# Patient Record
Sex: MALE | Race: WHITE | NOT HISPANIC OR LATINO | Employment: FULL TIME | URBAN - METROPOLITAN AREA
[De-identification: names, ages, dates, MRNs, and addresses within clinical notes are randomized per-mention and may not be internally consistent; named-entity substitution may affect disease eponyms.]

---

## 2017-09-03 ENCOUNTER — HOSPITAL ENCOUNTER (OUTPATIENT)
Facility: OTHER | Age: 27
Discharge: HOME | End: 2017-09-04
Attending: EMERGENCY MEDICINE | Admitting: ORTHOPAEDIC SURGERY
Payer: COMMERCIAL

## 2017-09-03 DIAGNOSIS — S66.922A LACERATION OF LEFT HAND INVOLVING TENDON, INITIAL ENCOUNTER: Primary | ICD-10-CM

## 2017-09-03 DIAGNOSIS — S61.412A LACERATION OF LEFT HAND INVOLVING TENDON, INITIAL ENCOUNTER: Primary | ICD-10-CM

## 2017-09-03 DIAGNOSIS — T14.90XA INJURY: ICD-10-CM

## 2017-09-03 DIAGNOSIS — S64.40XA INJURY OF DIGITAL NERVE OF FINGER, INITIAL ENCOUNTER: ICD-10-CM

## 2017-09-03 PROCEDURE — 90471 IMMUNIZATION ADMIN: CPT | Performed by: EMERGENCY MEDICINE

## 2017-09-03 PROCEDURE — 99285 EMERGENCY DEPT VISIT HI MDM: CPT | Mod: 25

## 2017-09-03 PROCEDURE — 25000003 PHARM REV CODE 250: Performed by: EMERGENCY MEDICINE

## 2017-09-03 PROCEDURE — G0378 HOSPITAL OBSERVATION PER HR: HCPCS

## 2017-09-03 PROCEDURE — 63600175 PHARM REV CODE 636 W HCPCS: Performed by: EMERGENCY MEDICINE

## 2017-09-03 PROCEDURE — 90670 PCV13 VACCINE IM: CPT | Performed by: EMERGENCY MEDICINE

## 2017-09-03 PROCEDURE — 63600175 PHARM REV CODE 636 W HCPCS: Performed by: ORTHOPAEDIC SURGERY

## 2017-09-03 PROCEDURE — 96374 THER/PROPH/DIAG INJ IV PUSH: CPT

## 2017-09-03 RX ORDER — MORPHINE SULFATE 4 MG/ML
4 INJECTION, SOLUTION INTRAMUSCULAR; INTRAVENOUS
Status: DISCONTINUED | OUTPATIENT
Start: 2017-09-03 | End: 2017-09-04 | Stop reason: HOSPADM

## 2017-09-03 RX ORDER — SODIUM CHLORIDE 9 MG/ML
INJECTION, SOLUTION INTRAVENOUS CONTINUOUS
Status: DISCONTINUED | OUTPATIENT
Start: 2017-09-03 | End: 2017-09-04 | Stop reason: HOSPADM

## 2017-09-03 RX ORDER — HYDROCODONE BITARTRATE AND ACETAMINOPHEN 5; 325 MG/1; MG/1
1 TABLET ORAL EVERY 4 HOURS PRN
Status: DISCONTINUED | OUTPATIENT
Start: 2017-09-03 | End: 2017-09-04 | Stop reason: HOSPADM

## 2017-09-03 RX ORDER — HYDROMORPHONE HYDROCHLORIDE 1 MG/ML
1 INJECTION, SOLUTION INTRAMUSCULAR; INTRAVENOUS; SUBCUTANEOUS EVERY 4 HOURS PRN
Status: DISCONTINUED | OUTPATIENT
Start: 2017-09-03 | End: 2017-09-03

## 2017-09-03 RX ORDER — METHOCARBAMOL 750 MG/1
750 TABLET, FILM COATED ORAL NIGHTLY PRN
COMMUNITY

## 2017-09-03 RX ORDER — PANTOPRAZOLE SODIUM 40 MG/1
40 TABLET, DELAYED RELEASE ORAL DAILY
COMMUNITY

## 2017-09-03 RX ORDER — FAMOTIDINE 20 MG/1
20 TABLET, FILM COATED ORAL DAILY
COMMUNITY

## 2017-09-03 RX ORDER — HYDROMORPHONE HYDROCHLORIDE 1 MG/ML
1 INJECTION, SOLUTION INTRAMUSCULAR; INTRAVENOUS; SUBCUTANEOUS
Status: COMPLETED | OUTPATIENT
Start: 2017-09-03 | End: 2017-09-03

## 2017-09-03 RX ORDER — DIPHENHYDRAMINE HYDROCHLORIDE 50 MG/ML
25 INJECTION INTRAMUSCULAR; INTRAVENOUS NIGHTLY PRN
Status: DISCONTINUED | OUTPATIENT
Start: 2017-09-03 | End: 2017-09-04 | Stop reason: HOSPADM

## 2017-09-03 RX ORDER — GEMFIBROZIL 600 MG/1
600 TABLET, FILM COATED ORAL DAILY
COMMUNITY

## 2017-09-03 RX ORDER — CEFAZOLIN SODIUM 1 G/50ML
1 SOLUTION INTRAVENOUS
Status: DISCONTINUED | OUTPATIENT
Start: 2017-09-03 | End: 2017-09-04 | Stop reason: HOSPADM

## 2017-09-03 RX ADMIN — HYDROMORPHONE HYDROCHLORIDE 1 MG: 1 INJECTION, SOLUTION INTRAMUSCULAR; INTRAVENOUS; SUBCUTANEOUS at 08:09

## 2017-09-03 RX ADMIN — DIPHENHYDRAMINE HYDROCHLORIDE 25 MG: 50 INJECTION, SOLUTION INTRAMUSCULAR; INTRAVENOUS at 11:09

## 2017-09-03 RX ADMIN — PNEUMOCOCCAL 13-VALENT CONJUGATE VACCINE 0.5 ML: 2.2; 2.2; 2.2; 2.2; 2.2; 4.4; 2.2; 2.2; 2.2; 2.2; 2.2; 2.2; 2.2 INJECTION, SUSPENSION INTRAMUSCULAR at 11:09

## 2017-09-03 RX ADMIN — MORPHINE SULFATE 4 MG: 4 INJECTION, SOLUTION INTRAMUSCULAR; INTRAVENOUS at 11:09

## 2017-09-03 RX ADMIN — CEFAZOLIN SODIUM 1 G: 1 SOLUTION INTRAVENOUS at 11:09

## 2017-09-03 RX ADMIN — SODIUM CHLORIDE: 0.9 INJECTION, SOLUTION INTRAVENOUS at 11:09

## 2017-09-04 ENCOUNTER — ANESTHESIA EVENT (OUTPATIENT)
Dept: SURGERY | Facility: OTHER | Age: 27
End: 2017-09-04
Payer: COMMERCIAL

## 2017-09-04 ENCOUNTER — ANESTHESIA (OUTPATIENT)
Dept: SURGERY | Facility: OTHER | Age: 27
End: 2017-09-04
Payer: COMMERCIAL

## 2017-09-04 VITALS
BODY MASS INDEX: 38.38 KG/M2 | HEIGHT: 71 IN | WEIGHT: 274.13 LBS | RESPIRATION RATE: 18 BRPM | TEMPERATURE: 98 F | SYSTOLIC BLOOD PRESSURE: 155 MMHG | OXYGEN SATURATION: 93 % | HEART RATE: 88 BPM | DIASTOLIC BLOOD PRESSURE: 79 MMHG

## 2017-09-04 PROCEDURE — 37000009 HC ANESTHESIA EA ADD 15 MINS: Performed by: ORTHOPAEDIC SURGERY

## 2017-09-04 PROCEDURE — 27800903 OPTIME MED/SURG SUP & DEVICES OTHER IMPLANTS: Performed by: ORTHOPAEDIC SURGERY

## 2017-09-04 PROCEDURE — 71000033 HC RECOVERY, INTIAL HOUR: Performed by: ORTHOPAEDIC SURGERY

## 2017-09-04 PROCEDURE — 25000003 PHARM REV CODE 250: Performed by: ORTHOPAEDIC SURGERY

## 2017-09-04 PROCEDURE — 63600175 PHARM REV CODE 636 W HCPCS: Performed by: EMERGENCY MEDICINE

## 2017-09-04 PROCEDURE — 37000008 HC ANESTHESIA 1ST 15 MINUTES: Performed by: ORTHOPAEDIC SURGERY

## 2017-09-04 PROCEDURE — 36000707: Performed by: ORTHOPAEDIC SURGERY

## 2017-09-04 PROCEDURE — 36000706: Performed by: ORTHOPAEDIC SURGERY

## 2017-09-04 PROCEDURE — 25000003 PHARM REV CODE 250: Performed by: EMERGENCY MEDICINE

## 2017-09-04 PROCEDURE — 25000003 PHARM REV CODE 250: Performed by: NURSE ANESTHETIST, CERTIFIED REGISTERED

## 2017-09-04 PROCEDURE — G0378 HOSPITAL OBSERVATION PER HR: HCPCS

## 2017-09-04 PROCEDURE — 63600175 PHARM REV CODE 636 W HCPCS: Performed by: ORTHOPAEDIC SURGERY

## 2017-09-04 PROCEDURE — 25000003 PHARM REV CODE 250: Performed by: ANESTHESIOLOGY

## 2017-09-04 PROCEDURE — 63600175 PHARM REV CODE 636 W HCPCS: Performed by: NURSE ANESTHETIST, CERTIFIED REGISTERED

## 2017-09-04 PROCEDURE — 27201423 OPTIME MED/SURG SUP & DEVICES STERILE SUPPLY: Performed by: ORTHOPAEDIC SURGERY

## 2017-09-04 DEVICE — IMPLANTABLE DEVICE: Type: IMPLANTABLE DEVICE | Site: HAND | Status: FUNCTIONAL

## 2017-09-04 DEVICE — CONNECTOR NERVE 2X15MM: Type: IMPLANTABLE DEVICE | Site: HAND | Status: FUNCTIONAL

## 2017-09-04 DEVICE — PROTECTOR AXOGUARD NERVE 7X40: Type: IMPLANTABLE DEVICE | Site: HAND | Status: FUNCTIONAL

## 2017-09-04 DEVICE — CONNECTOR NRV AXOGUARD 3X15MM: Type: IMPLANTABLE DEVICE | Site: HAND | Status: FUNCTIONAL

## 2017-09-04 RX ORDER — FENTANYL CITRATE 50 UG/ML
25 INJECTION, SOLUTION INTRAMUSCULAR; INTRAVENOUS EVERY 5 MIN PRN
Status: DISCONTINUED | OUTPATIENT
Start: 2017-09-04 | End: 2017-09-04 | Stop reason: HOSPADM

## 2017-09-04 RX ORDER — SODIUM CHLORIDE, SODIUM LACTATE, POTASSIUM CHLORIDE, CALCIUM CHLORIDE 600; 310; 30; 20 MG/100ML; MG/100ML; MG/100ML; MG/100ML
INJECTION, SOLUTION INTRAVENOUS CONTINUOUS PRN
Status: DISCONTINUED | OUTPATIENT
Start: 2017-09-04 | End: 2017-09-04

## 2017-09-04 RX ORDER — NEOSTIGMINE METHYLSULFATE 1 MG/ML
INJECTION, SOLUTION INTRAVENOUS
Status: DISCONTINUED | OUTPATIENT
Start: 2017-09-04 | End: 2017-09-04

## 2017-09-04 RX ORDER — OXYCODONE HYDROCHLORIDE 5 MG/1
5 TABLET ORAL
Status: DISCONTINUED | OUTPATIENT
Start: 2017-09-04 | End: 2017-09-04 | Stop reason: HOSPADM

## 2017-09-04 RX ORDER — DEXAMETHASONE SODIUM PHOSPHATE 4 MG/ML
INJECTION, SOLUTION INTRA-ARTICULAR; INTRALESIONAL; INTRAMUSCULAR; INTRAVENOUS; SOFT TISSUE
Status: DISCONTINUED | OUTPATIENT
Start: 2017-09-04 | End: 2017-09-04

## 2017-09-04 RX ORDER — OXYCODONE HYDROCHLORIDE 5 MG/1
5 TABLET ORAL EVERY 4 HOURS PRN
Qty: 30 TABLET | Refills: 0 | Status: SHIPPED | OUTPATIENT
Start: 2017-09-04

## 2017-09-04 RX ORDER — MEPERIDINE HYDROCHLORIDE 50 MG/ML
12.5 INJECTION INTRAMUSCULAR; INTRAVENOUS; SUBCUTANEOUS ONCE AS NEEDED
Status: DISCONTINUED | OUTPATIENT
Start: 2017-09-04 | End: 2017-09-04 | Stop reason: HOSPADM

## 2017-09-04 RX ORDER — HYDROMORPHONE HYDROCHLORIDE 2 MG/ML
0.4 INJECTION, SOLUTION INTRAMUSCULAR; INTRAVENOUS; SUBCUTANEOUS EVERY 5 MIN PRN
Status: DISCONTINUED | OUTPATIENT
Start: 2017-09-04 | End: 2017-09-04 | Stop reason: HOSPADM

## 2017-09-04 RX ORDER — ONDANSETRON 2 MG/ML
INJECTION INTRAMUSCULAR; INTRAVENOUS
Status: DISCONTINUED | OUTPATIENT
Start: 2017-09-04 | End: 2017-09-04

## 2017-09-04 RX ORDER — SODIUM CHLORIDE 0.9 % (FLUSH) 0.9 %
3 SYRINGE (ML) INJECTION
Status: DISCONTINUED | OUTPATIENT
Start: 2017-09-04 | End: 2017-09-04 | Stop reason: HOSPADM

## 2017-09-04 RX ORDER — BUPIVACAINE HYDROCHLORIDE 2.5 MG/ML
INJECTION, SOLUTION EPIDURAL; INFILTRATION; INTRACAUDAL
Status: DISCONTINUED | OUTPATIENT
Start: 2017-09-04 | End: 2017-09-04 | Stop reason: HOSPADM

## 2017-09-04 RX ORDER — FENTANYL CITRATE 50 UG/ML
INJECTION, SOLUTION INTRAMUSCULAR; INTRAVENOUS
Status: DISCONTINUED | OUTPATIENT
Start: 2017-09-04 | End: 2017-09-04

## 2017-09-04 RX ORDER — BACITRACIN 50000 [IU]/1
INJECTION, POWDER, FOR SOLUTION INTRAMUSCULAR
Status: DISCONTINUED | OUTPATIENT
Start: 2017-09-04 | End: 2017-09-04 | Stop reason: HOSPADM

## 2017-09-04 RX ORDER — CEPHALEXIN 500 MG/1
500 CAPSULE ORAL 4 TIMES DAILY
Qty: 40 CAPSULE | Refills: 0 | Status: SHIPPED | OUTPATIENT
Start: 2017-09-04 | End: 2017-09-14

## 2017-09-04 RX ORDER — GLYCOPYRROLATE 0.2 MG/ML
INJECTION INTRAMUSCULAR; INTRAVENOUS
Status: DISCONTINUED | OUTPATIENT
Start: 2017-09-04 | End: 2017-09-04

## 2017-09-04 RX ORDER — PROPOFOL 10 MG/ML
VIAL (ML) INTRAVENOUS
Status: DISCONTINUED | OUTPATIENT
Start: 2017-09-04 | End: 2017-09-04

## 2017-09-04 RX ORDER — HYDROMORPHONE HYDROCHLORIDE 2 MG/ML
INJECTION, SOLUTION INTRAMUSCULAR; INTRAVENOUS; SUBCUTANEOUS
Status: DISCONTINUED | OUTPATIENT
Start: 2017-09-04 | End: 2017-09-04

## 2017-09-04 RX ORDER — MIDAZOLAM HYDROCHLORIDE 1 MG/ML
INJECTION INTRAMUSCULAR; INTRAVENOUS
Status: DISCONTINUED | OUTPATIENT
Start: 2017-09-04 | End: 2017-09-04

## 2017-09-04 RX ORDER — ROCURONIUM BROMIDE 10 MG/ML
INJECTION, SOLUTION INTRAVENOUS
Status: DISCONTINUED | OUTPATIENT
Start: 2017-09-04 | End: 2017-09-04

## 2017-09-04 RX ORDER — LIDOCAINE HCL/PF 100 MG/5ML
SYRINGE (ML) INTRAVENOUS
Status: DISCONTINUED | OUTPATIENT
Start: 2017-09-04 | End: 2017-09-04

## 2017-09-04 RX ADMIN — PROPOFOL 200 MG: 10 INJECTION, EMULSION INTRAVENOUS at 12:09

## 2017-09-04 RX ADMIN — HYDROMORPHONE HYDROCHLORIDE 1 MG: 2 INJECTION INTRAMUSCULAR; INTRAVENOUS; SUBCUTANEOUS at 05:09

## 2017-09-04 RX ADMIN — FENTANYL CITRATE 100 MCG: 50 INJECTION, SOLUTION INTRAMUSCULAR; INTRAVENOUS at 12:09

## 2017-09-04 RX ADMIN — MORPHINE SULFATE 4 MG: 4 INJECTION, SOLUTION INTRAMUSCULAR; INTRAVENOUS at 07:09

## 2017-09-04 RX ADMIN — GLYCOPYRROLATE 0.8 MG: 0.2 INJECTION, SOLUTION INTRAMUSCULAR; INTRAVENOUS at 05:09

## 2017-09-04 RX ADMIN — MIDAZOLAM HYDROCHLORIDE 2 MG: 1 INJECTION, SOLUTION INTRAMUSCULAR; INTRAVENOUS at 11:09

## 2017-09-04 RX ADMIN — SODIUM CHLORIDE, SODIUM LACTATE, POTASSIUM CHLORIDE, AND CALCIUM CHLORIDE: 600; 310; 30; 20 INJECTION, SOLUTION INTRAVENOUS at 11:09

## 2017-09-04 RX ADMIN — CARBOXYMETHYLCELLULOSE SODIUM 2 DROP: 2.5 SOLUTION/ DROPS OPHTHALMIC at 12:09

## 2017-09-04 RX ADMIN — DEXAMETHASONE SODIUM PHOSPHATE 4 MG: 4 INJECTION, SOLUTION INTRAMUSCULAR; INTRAVENOUS at 05:09

## 2017-09-04 RX ADMIN — FENTANYL CITRATE 50 MCG: 50 INJECTION, SOLUTION INTRAMUSCULAR; INTRAVENOUS at 12:09

## 2017-09-04 RX ADMIN — MORPHINE SULFATE 4 MG: 4 INJECTION, SOLUTION INTRAMUSCULAR; INTRAVENOUS at 02:09

## 2017-09-04 RX ADMIN — NEOSTIGMINE METHYLSULFATE 5 MG: 1 INJECTION INTRAVENOUS at 05:09

## 2017-09-04 RX ADMIN — ROCURONIUM BROMIDE 50 MG: 10 INJECTION INTRAVENOUS at 12:09

## 2017-09-04 RX ADMIN — HYDROMORPHONE HYDROCHLORIDE 0.4 MG: 2 INJECTION INTRAMUSCULAR; INTRAVENOUS; SUBCUTANEOUS at 05:09

## 2017-09-04 RX ADMIN — LIDOCAINE HYDROCHLORIDE 75 MG: 20 INJECTION, SOLUTION INTRAVENOUS at 12:09

## 2017-09-04 RX ADMIN — CEFAZOLIN SODIUM 1 G: 1 SOLUTION INTRAVENOUS at 12:09

## 2017-09-04 RX ADMIN — HYDROCODONE BITARTRATE AND ACETAMINOPHEN 1 TABLET: 5; 325 TABLET ORAL at 08:09

## 2017-09-04 RX ADMIN — MORPHINE SULFATE 4 MG: 4 INJECTION, SOLUTION INTRAMUSCULAR; INTRAVENOUS at 10:09

## 2017-09-04 RX ADMIN — HYDROCODONE BITARTRATE AND ACETAMINOPHEN 1 TABLET: 5; 325 TABLET ORAL at 06:09

## 2017-09-04 RX ADMIN — CEFAZOLIN SODIUM 1 G: 1 SOLUTION INTRAVENOUS at 06:09

## 2017-09-04 RX ADMIN — FENTANYL CITRATE 50 MCG: 50 INJECTION, SOLUTION INTRAMUSCULAR; INTRAVENOUS at 03:09

## 2017-09-04 RX ADMIN — HYDROMORPHONE HYDROCHLORIDE 0.2 MG: 2 INJECTION INTRAMUSCULAR; INTRAVENOUS; SUBCUTANEOUS at 05:09

## 2017-09-04 RX ADMIN — HYDROMORPHONE HYDROCHLORIDE 0.4 MG: 2 INJECTION INTRAMUSCULAR; INTRAVENOUS; SUBCUTANEOUS at 04:09

## 2017-09-04 RX ADMIN — ROCURONIUM BROMIDE 10 MG: 10 INJECTION INTRAVENOUS at 03:09

## 2017-09-04 RX ADMIN — ONDANSETRON 4 MG: 2 INJECTION INTRAMUSCULAR; INTRAVENOUS at 05:09

## 2017-09-04 RX ADMIN — OXYCODONE HYDROCHLORIDE 5 MG: 5 TABLET ORAL at 06:09

## 2017-09-04 NOTE — PLAN OF CARE
Problem: Patient Care Overview  Goal: Plan of Care Review  Outcome: Ongoing (interventions implemented as appropriate)  No significant events overnight. Remains free from fall, injury, and skin breakdown. Voiding/ambulation promoted. Up in room. VSS on RA throughout the night. Positions self without assistance. Pt states pain well controlled with morphine IV med. Dressing CDI. TEDs/SCDs refused at this time. Plan of care reviewed with patient and all questions answered. Bed low, locked w/ side rails upx2. Call light within reach. Purposeful rounding performed. Resting comfortably in bed, no other complaints at this time.

## 2017-09-04 NOTE — ANESTHESIA PREPROCEDURE EVALUATION
09/04/2017  Elliot Webb is a 27 y.o., male.    Anesthesia Evaluation    I have reviewed the Patient Summary Reports.    I have reviewed the Nursing Notes.   I have reviewed the Medications.     Review of Systems  Anesthesia Hx:  No problems with previous Anesthesia    Social:  Non-Smoker    Cardiovascular:   Exercise tolerance: good    Pulmonary:   Asthma mild    Hepatic/GI:   GERD, well controlled        Physical Exam  General:  Obesity    Airway/Jaw/Neck:  Airway Findings: Mouth Opening: Normal Tongue: Normal  General Airway Assessment: Adult  Mallampati: II  TM Distance: Normal, at least 6 cm  Jaw/Neck Findings:     Neck ROM: Normal ROM      Dental:  Dental Findings: In tact             Anesthesia Plan  Type of Anesthesia, risks & benefits discussed:  Anesthesia Type:  general  Patient's Preference:   Intra-op Monitoring Plan:   Intra-op Monitoring Plan Comments:   Post Op Pain Control Plan:   Post Op Pain Control Plan Comments:   Induction:   IV  Beta Blocker:         Informed Consent: Patient understands risks and agrees with Anesthesia plan.  Questions answered. Anesthesia consent signed with patient.  ASA Score: 2  emergent   Day of Surgery Review of History & Physical:    H&P update referred to the surgeon.         Ready For Surgery From Anesthesia Perspective.

## 2017-09-04 NOTE — ED TRIAGE NOTES
Treatment received at [facility] ER prior to transfer:  -Wound irrigated and dressed.  -Pt rec'ed Tdap, Zofran 4mg IVP, Dilaudid 1 mg IVP x2 (last dose 1709).     Treatment received en route per EMS:  - Fentanyl 100 mcg at approx 1900

## 2017-09-04 NOTE — ANESTHESIA POSTPROCEDURE EVALUATION
"Anesthesia Post Evaluation    Patient: Elliot Webb    Procedure(s) Performed: Procedure(s) (LRB):  REPAIR-LACERATION (Left)  REPAIR-TENDON-HAND  REPAIR-NERVE-HAND (Left)    Final Anesthesia Type: general  Patient location during evaluation: PACU  Patient participation: Yes- Able to Participate  Level of consciousness: awake and alert  Post-procedure vital signs: reviewed and stable  Pain management: adequate  Airway patency: patent  PONV status at discharge: No PONV  Anesthetic complications: no      Cardiovascular status: hemodynamically stable  Respiratory status: unassisted  Hydration status: euvolemic  Follow-up not needed.        Visit Vitals  BP (!) 158/77   Pulse 91   Temp 36.6 °C (97.9 °F)   Resp 16   Ht 5' 11" (1.803 m)   Wt 124.3 kg (274 lb 1.6 oz)   SpO2 100%   BMI 38.23 kg/m²       Pain/Keaton Score: Pain Assessment Performed: Yes (9/4/2017  5:45 PM)  Presence of Pain: denies (9/4/2017  5:45 PM)  Pain Rating Prior to Med Admin: 5 (9/4/2017  6:08 PM)  Pain Rating Post Med Admin: 5 (9/4/2017  6:15 PM)  Keaton Score: 9 (9/4/2017  6:15 PM)      "

## 2017-09-04 NOTE — PROGRESS NOTES
Pt arrived to floor heavily sedated. VS taken with SHANI Mijares; elevated BP noted 185/102, SCDs applied, call light placed within reach, bed low and locked, and family at bedside. Pt complains of pain 10/10. Dressing to L hand, WDL. Diet ordered.  No acute distress noted at this time. Will continue to monitor.     1859  /99.    1924  /99 Pt AAOX3. Pain 8/10. Voided per urinal; concentrated urine noted. Pt waiting for his tray.

## 2017-09-04 NOTE — PLAN OF CARE
Pt in surgery at time of assessment. CM to follow with additional information for discharge needs after surgeon completes notes.     09/04/17 9410   Discharge Assessment   Assessment Type Discharge Planning Assessment   Prior to hospitilization cognitive status: Alert/Oriented   Prior to hospitalization functional status: Independent   Current cognitive status: Alert/Oriented   Current Functional Status: Independent   Lives With spouse   Able to Return to Prior Arrangements yes   Is patient able to care for self after discharge? Yes  (with family assist)   Patient's perception of discharge disposition home or selfcare   Readmission Within The Last 30 Days no previous admission in last 30 days   Patient currently being followed by outpatient case management? No   Equipment Currently Used at Home none   Do you have any problems affording any of your prescribed medications? No   Is the patient taking medications as prescribed? yes   Does the patient have transportation home? Yes   Does the patient receive services at the [facility]? No   Discharge Plan A Home   Discharge Plan B Home   Patient/Family In Agreement With Plan yes

## 2017-09-04 NOTE — H&P
"[facility]  Orthopedics  Progress Note    Patient Name: Elliot Webb  MRN: [id number]  Admission Date: 9/3/2017  Hospital Length of Stay: 0 days  Attending Provider: Claude S. Williams IV, MD  Primary Care Provider: Primary Doctor No  Follow-up For: Procedure(s) (LRB):  REPAIR-LACERATION (Left)    Post-Operative Day:    Subjective:     Principal Problem:<principal problem not specified>    Principal Orthopedic Problem: Left hand laceration    Interval History: 27 RHD man sustained an injury to his left hand while at home working with a table saw. Evaluated in [facility] where the wound was thoroughly cleaned and dressed.  Transferred to [facility] for definitive management. He complains of loss of feeling in the hand and limited motions rosie of the thumb. He works as a .    Review of patient's allergies indicates:   Allergen Reactions    Penicillins        Current Facility-Administered Medications   Medication    0.9%  NaCl infusion    ceFAZolin (ANCEF) 1 gram in dextrose 5 % 50 mL IVPB (premix)    diphenhydrAMINE injection 25 mg    hydrocodone-acetaminophen 5-325mg per tablet 1 tablet    morphine injection 4 mg     Objective:     Vital Signs (Most Recent):  Temp: 98.2 °F (36.8 °C) (09/04/17 0720)  Pulse: 83 (09/04/17 0720)  Resp: 18 (09/04/17 0720)  BP: 133/62 (09/04/17 0720)  SpO2: (!) 94 % (09/04/17 0720) Vital Signs (24h Range):  Temp:  [98.1 °F (36.7 °C)-98.4 °F (36.9 °C)] 98.2 °F (36.8 °C)  Pulse:  [75-87] 83  Resp:  [18] 18  SpO2:  [94 %-97 %] 94 %  BP: (121-151)/(56-81) 133/62     Weight: 124.3 kg (274 lb 1.6 oz)  Height: 5' 11" (180.3 cm)  Body mass index is 38.23 kg/m².      Intake/Output Summary (Last 24 hours) at 09/04/17 0808  Last data filed at 09/04/17 0634   Gross per 24 hour   Intake              720 ml   Output                0 ml   Net              720 ml       Ortho/SPM Exam  Alert and appropriate with exam  Left hand exam: Laceration across the thenar eminence and palm " extending from the base of the 1st metacarpal to the distal 5th metacarpal. No gross contamination. Unable to flex the thumb or to abduct or oppose.  There is intact FDS and FDP function to all fingers.   Asensate volar thumb, index, long and ring fingers. <2sec CR to all digits. All warm. INtact extension.    Significant Labs: All pertinent labs within the past 24 hours have been reviewed.    Significant Imaging: Xray shows a small cortical defect of the 1st metacarpal    Assessment/Plan:     Active Diagnoses:    Diagnosis Date Noted POA    Laceration of left hand involving tendon [S61.412A, S66.922A] 09/03/2017 Yes      Problems Resolved During this Admission:    Diagnosis Date Noted Date Resolved POA     Plan Irrigation and Debridemet, exploration of left hand.  Repair of damaged structures including flexor tendon, nerve repair with possible allograft conduit nerve graft.    Claude S. Williams Iv, MD  Orthopedics  [facility]

## 2017-09-04 NOTE — ED TRIAGE NOTES
Pt arrived by EMS from [place] ER with c/o laceration to L hand after sustaining injury from table saw.  Small amount of blood visible on bandage.  Pt received tetanus shot. 4 mg zofran, and dilaudid 1 mg IV x2 at previous facility.  EMS reports pt received 100 mcg Fentanyl en route.  Pt reports pain 8/10.

## 2017-09-04 NOTE — ED PROVIDER NOTES
"Encounter Date: 9/3/2017    SCRIBE #1 NOTE: I, Paola Regalado, am scribing for, and in the presence of, Dr. Schmid.       History     Chief Complaint   Patient presents with    Transfer-Hand Trauma     pt transferred in from [facility], accepted by Dr. Claude Williams for L hand trauma secondary from table saw     Time seen by provider: 8:38 PM    This is a 27 y.o. male who presents to the ED via EMS with complaint of left hand injury that occurred this morning.  The patient was transferred to this facility from [facility] in [place] for evaluation and surgical repair by Dr. Peña.  He reports that he brushed his hand across a table saw after turning it off, not realizing that the saw was still spinning.  He endorses a laceration to the palmar surface of his left hand with associated pain, weakness of the left thumb, and numbness in the left thumb, left index finger, left middle finger, and left ring finger.  He denies other injuries.  The patient reports that he was given "2 rounds of dilaudid" and IV antibiotics at the hospital in [place].  Despite also receiving fentanyl in the ambulance while in route to the facility, he claims that his pain remains an 8/10 in severity.  He states that his pain is exacerbated with movement, but he mentions no other identifying, alleviating, or exacerbating factors.  The patient reports history of asthma, but he mentions no prior surgical history.  He states that his tetanus vaccination is current.  He notes that he is right hand dominant.  The patient claims that he last ate food at 10 AM, and he last drank fluids at 12 PM.          The history is provided by the patient.     Review of patient's allergies indicates:   Allergen Reactions    Penicillins      Past Medical History:   Diagnosis Date    Acid reflux     Asthma      History reviewed. No pertinent surgical history.  History reviewed. No pertinent family history.  Social History "   Substance Use Topics    Smoking status: Never Smoker    Smokeless tobacco: Never Used    Alcohol use Yes      Comment: occasionally     Review of Systems   Constitutional: Negative for chills and fever.   HENT: Negative for congestion and facial swelling.    Respiratory: Negative for chest tightness and shortness of breath.    Cardiovascular: Negative for chest pain.   Gastrointestinal: Negative for abdominal pain, nausea and vomiting.   Endocrine: Negative for polyuria.   Genitourinary: Negative for dysuria.   Musculoskeletal: Negative for myalgias.        Positive for left hand pain   Skin: Positive for wound. Negative for rash.   Neurological: Positive for weakness and numbness. Negative for headaches.       Physical Exam     Initial Vitals [09/03/17 2029]   BP Pulse Resp Temp SpO2   (!) 151/71 87 18 98.1 °F (36.7 °C) 96 %      MAP       97.67         Physical Exam    Nursing note and vitals reviewed.  Constitutional: He appears well-developed and well-nourished. He is not diaphoretic. No distress.   Uncomfortable appearing.    HENT:   Head: Normocephalic and atraumatic.   Right Ear: External ear normal.   Left Ear: External ear normal.   Eyes: EOM are normal. Right eye exhibits no discharge. Left eye exhibits no discharge.   Neck: Normal range of motion.   Cardiovascular: Normal rate, regular rhythm and normal heart sounds. Exam reveals no gallop and no friction rub.    No murmur heard.  Pulmonary/Chest: Breath sounds normal. No respiratory distress. He has no wheezes. He has no rhonchi. He has no rales.   Abdominal: Soft. There is no tenderness. There is no rebound and no guarding.   Musculoskeletal:        Left hand: He exhibits laceration. He exhibits normal capillary refill. Decreased sensation noted. Decreased strength noted.   Remaining fingers non-tender and normal ROM.    Neurological: He is alert and oriented to person, place, and time. A sensory deficit is present.   Decreased sensation on the  lateral aspect of the left thumb, but preserved medially.  Absent sensation to both sides of the left index finger.  Slightly decreased sensation to the lateral aspect of the left middle finger, but normal sensation on the medial aspect.  Lack of sensation on both sides of the left ring finger.  Normal sensation in the left little finger.    0/5 flexion at the MCP and IP of the left thumb.  2/5 extension at MCP and IP of the left thumb.  Preserved flexion and extension at all MCP's, PIP's, and DIP's, even with isolated testing, although pain limits full strength.     Skin: Skin is warm and dry. Capillary refill takes less than 2 seconds. Laceration noted. No rash and no abscess noted. No erythema. No pallor.   Normal capillary refill in all fingers.  Laceration, which runs from the lateral-most aspect of the left hand midpoint of the first phalanx diagonally, transecting the thenar imminence and ending at the lateral base of the ring finger.   Psychiatric: He has a normal mood and affect. His behavior is normal. Judgment and thought content normal.         ED Course   Procedures  Labs Reviewed - No data to display   Imaging Results          X-Ray Hand 2 View Left (Final result)  Result time 09/03/17 21:09:25    Final result by Kathe Aguila MD (09/03/17 21:09:25)                 Impression:      As above.      Electronically signed by: KATHE AGUILA MD  Date:     09/03/17  Time:    21:09              Narrative:    Left hand 2 views PA and lateral.  Comparison: None.    Soft tissue injury with laceration seen involving the thenar region.  There is nondisplaced fracture involving the lateral aspect of the first metacarpal seen on lateral view.  No evidence of intra-articular extension.  No additional fracture or dislocation seen.    There is nonexpansile 1.8 cm nonaggressive lucent lesion visualized within the fifth proximal phalanx, suspected enchondroma.                                   X-Rays:   Independently  Interpreted Readings:   Other Readings:  X-Ray Hand 2 View Left: Small fracture on the lateral cortex mid-portion of the first metacarpal.     Medical Decision Making:   Clinical Tests:   Radiological Study: Ordered and Reviewed  Other:   I have discussed this case with another health care provider.       <> Summary of the Discussion: 9:00 PM.  Case discussed with Dr. Peña, who will evaluate patient presently.  Plan for OR in the morning.    9:26 PM.  Dr. Peña has arrive in the ED to evaluate the patient.              Scribe Attestation:   Scribe #1: I performed the above scribed service and the documentation accurately describes the services I performed. I attest to the accuracy of the note.    Attending Attestation:           Physician Attestation for Scribe:  Physician Attestation Statement for Scribe #1: I, Dr. Schmid, reviewed documentation, as scribed by Paola Regalado in my presence, and it is both accurate and complete.                 ED Course      Patient presents referred from outside facility due to laceration of the hand with concern for tendon versus muscular involvement as well as involvement of multiple digital nerves.  Images were not sent therefore x-ray was repeated.  Already received Ancef.  Case discussed with orthopedics who evaluated patient in the emergency department.  Will be admitted with planned exploration and repair in the morning.    Clinical Impression:     1. Laceration of left hand involving tendon, initial encounter    2. Injury    3. Injury of digital nerve of finger, initial encounter                               Charly Schmid II, MD  09/03/17 0449

## 2017-09-04 NOTE — BRIEF OP NOTE
[facility]  Brief Operative Note     SUMMARY     Surgery Date: 9/4/2017     Surgeon(s) and Role:     * Claude S. Williams IV, MD - Primary    Assisting Surgeon: None    Pre-op Diagnosis:  Laceration involving tendon [T14.8]  Flexor pollicus longus laceration; palmar digital nerve lacerations    Post-op Diagnosis:  Post-Op Diagnosis Codes: same    Procedure(s) (LRB):  REPAIR-LACERATION (Left)  REPAIR-TENDON-HAND  REPAIR-NERVE-HAND (Left)     Repair Lt hand FDP tendon  Repair thumb radial digital nerve with axogen nerve conduit protector  Reconstruction thumb ulnar digital nerve with Avance 2-3mm allograft nerve  Reconstruction of digital nerves with Avance 2-3mm allograft nerve including the index radial and ulnar n.s; long finger radial dig.n. ; ring finger radial and ulnar dig n.   Anesthesia: Choice    Description of the findings of the procedure: as above      Findings/Key Components: as above    Estimated Blood Loss: *50cc *         Specimens:   Specimen (12h ago through future)    None          Discharge Note    SUMMARY     Admit Date: 9/3/2017    Discharge Date and Time:  09/04/2017 5:56 PM    Hospital Course (synopsis of major diagnoses, care, treatment, and services provided during the course of the hospital stay): uneventful     Final Diagnosis: Post-Op Diagnosis Codes:     * Laceration involving tendon [T14.8]    Disposition: Home or Self Care    Follow Up/Patient Instructions:     Medications:  Reconciled Home Medications:   Current Discharge Medication List      START taking these medications    Details   cephALEXin (KEFLEX) 500 MG capsule Take 1 capsule (500 mg total) by mouth 4 (four) times daily.  Qty: 40 capsule, Refills: 0      oxycodone (ROXICODONE) 5 MG immediate release tablet Take 1 tablet (5 mg total) by mouth every 4 (four) hours as needed for Pain.  Qty: 30 tablet, Refills: 0         CONTINUE these medications which have NOT CHANGED    Details   famotidine (PEPCID) 20 MG  tablet Take 20 mg by mouth once daily.      gemfibrozil (LOPID) 600 MG tablet Take 600 mg by mouth once daily.      methocarbamol (ROBAXIN) 750 MG Tab Take 750 mg by mouth nightly as needed. 2 tabs nightly      pantoprazole (PROTONIX) 40 MG tablet Take 40 mg by mouth once daily.             Discharge Procedure Orders  Diet general     Call MD for:  temperature >100.4     Call MD for:  persistent nausea and vomiting     Call MD for:  severe uncontrolled pain     Call MD for:  difficulty breathing, headache or visual disturbances     Call MD for:  redness, tenderness, or signs of infection (pain, swelling, redness, odor or green/yellow discharge around incision site)     Call MD for:  hives     Call MD for:  persistent dizziness or light-headedness     Call MD for:  extreme fatigue     Leave dressing on - Keep it clean, dry, and intact until clinic visit     Keep surgical extremity elevated     Lifting restrictions     No driving, operating heavy equipment or signing legal documents while taking pain medication       Follow-up Information     Claude S. Williams Iv, MD On 9/8/2017.    Specialty:  Orthopedic Surgery  Why:  For wound re-check  Contact information:  [address]  [facility] [address]  [phone]

## 2017-09-04 NOTE — NURSING
"Patient still c/o pain 8/10 after administration of 1mg dilaudid in ED at 2050. He stated "it only made me sleepy and lightheaded." Patient also notified he was to be NPO at midnight and he interjects stating "the Dr lara said I can drink water until 8am." Dr. Peña notified about patient request for water in spite of order due to update he was given in ED and that his pain is not well controlled with dilaudid. Dr Peña gave ok for patient to have water until 7am only and then completely NPO afterward. MD also instructed to cancel dilaudid and order 4mg morphine q3prn for pain. Will continue to monitor.  "

## 2017-09-04 NOTE — NURSING
Patient arrived to floor JANICEO, nad noted, in wheelchair with ER PCT. Patient ambulated to bed without need for assistance. Oriented to room and unit. VSS. IVs SL and intact. Bed in lowest position, side rails upx2, call light in reach. Will continue to monitor.

## 2017-09-04 NOTE — TRANSFER OF CARE
"Anesthesia Transfer of Care Note    Patient: Elliot Webb    Procedure(s) Performed: Procedure(s) (LRB):  REPAIR-LACERATION (Left)  REPAIR-TENDON-HAND  REPAIR-NERVE-HAND (Left)    Patient location: PACU    Anesthesia Type: general    Transport from OR: Transported from OR on 2-3 L/min O2 by NC with adequate spontaneous ventilation    Post pain: adequate analgesia    Post assessment: no apparent anesthetic complications    Post vital signs: stable    Level of consciousness: responds to stimulation    Nausea/Vomiting: no nausea/vomiting    Complications: none    Transfer of care protocol was followed      Last vitals:   Visit Vitals  /62 (BP Location: Right arm, Patient Position: Lying)   Pulse 83   Temp 36.8 °C (98.2 °F) (Oral)   Resp 18   Ht 5' 11" (1.803 m)   Wt 124.3 kg (274 lb 1.6 oz)   SpO2 (!) 94%   BMI 38.23 kg/m²     "

## 2017-09-04 NOTE — PLAN OF CARE
Problem: Patient Care Overview  Goal: Plan of Care Review  Outcome: Ongoing (interventions implemented as appropriate)  Patient still in surgery at this time.

## 2017-09-04 NOTE — INTERVAL H&P NOTE
The patient has been examined and the H&P has been reviewed:    I concur with the findings and no changes have occurred since H&P was written.    Anesthesia/Surgery risks, benefits and alternative options discussed and understood by patient/family.          Active Hospital Problems    Diagnosis  POA    Laceration of left hand involving tendon [D26.346F, S12.908O]  Yes      Resolved Hospital Problems    Diagnosis Date Resolved POA   No resolved problems to display.

## 2017-09-05 ENCOUNTER — NURSE TRIAGE (OUTPATIENT)
Dept: ADMINISTRATIVE | Facility: CLINIC | Age: 27
End: 2017-09-05

## 2017-09-05 NOTE — PLAN OF CARE
Patient discharged home in c/o spouse and family members. Patient ambulating, eating, and voiding without problems, stated he's ready to go home. Surgical dressing CDI, fingers to left hand pink, warm, mobile. Sling provided per patient request. All discharge instructions reviewed with patient and family and all verbalized understanding. Patient to discharge ramp per wheelchair.

## 2017-09-05 NOTE — TELEPHONE ENCOUNTER
"    Reason for Disposition   Caller requesting a NON-URGENT new prescription or refill and triager unable to refill per unit policy   [1] SEVERE post-op pain (e.g., excruciating, pain scale 8-10) AND [2] not controlled with pain medications    Answer Assessment - Initial Assessment Questions  1. SYMPTOMS: "Do you have any symptoms?"      Hand pain  2. SEVERITY: If symptoms are present, ask "Are they mild, moderate or severe?"      -  Patient is requesting a stronger medication for pain. Advised caller that patient will need to return to ED.    Answer Assessment - Initial Assessment Questions  1. SYMPTOM: "What's the main symptom you're concerned about?" (e.g., pain, fever, vomiting)      pain  2. ONSET: "When did ________  start?"      -  3. SURGERY: "What surgery was performed?"      Laceration repair  4. DATE of SURGERY: "When was surgery performed?"       9/4/17  5. ANESTHESIA: " What type of anesthesia did you have?" (e.g., general, spinal, epidural, local)      -  6. PAIN: "Is there any pain?" If so, ask: "How bad is it?"  (Scale 1-10; or mild, moderate, severe)     Moderate to severe  7. FEVER: "Do you have a fever?" If so, ask: "What is your temperature, how was it measured, and when did it start?"      -  8. VOMITING: "Is there any vomiting?" If yes, ask: "How many times?"      -  9. BLEEDING: "Is there any bleeding?" If so, ask: "How much?" and "Where?"      -  10. OTHER SYMPTOMS: "Do you have any other symptoms?" (e.g., drainage from wound, painful urination, constipation)        -    Protocols used: ST MEDICATION QUESTION CALL-A-AH, ST POST-OP SYMPTOMS AND [id number]      "

## 2017-09-13 NOTE — OP NOTE
DATE OF PROCEDURE:  09/04/2017.    PREOPERATIVE DIAGNOSIS:  Left hand palmar laceration, laceration of left thumb   flexor pollicis longus tendon, laceration of left hand palmar common digital   nerves, and laceration of left thumb thenar musculature.    POSTOPERATIVE DIAGNOSES:  Left hand palmar laceration, left thumb laceration of   the thenar musculature, laceration of left thumb flexor pollicis longus tendon,   laceration of left thumb radial digital nerve, laceration of left thumb ulnar   digital nerve, left thumb laceration of index finger radial digital nerve,   laceration of left hand common digital nerve to the index and long finger, left   hand laceration of the radial digital nerve of the ring finger, laceration of   the common palmar digital nerve to the ring and small finger, lacerations of the   small finger ulnar digital nerve.    PROCEDURE PERFORMED:  Left hand irrigation and debridement, left hand repair,   left hand repair of thumb flexor pollicis longus tendon, repair of left thumb   radial digital nerve using AxoGen nerve conduit protector, reconstruction of   left thumb ulnar digital nerve using Avance 2-3 mm allograft nerve approximately   1.5 cm, reconstruction of the digital nerves of the index finger, radial   aspect, common digital nerve of the index and long finger, radial digital nerve   of the ring finger, common digital nerve of the ring and small finger and ulnar   digital nerves of the small finger using Avance 2-3 mm allograft cable nerve   grafts.    INDICATIONS:  Mr. Webb is a 27-year-old gentleman who sustained an injury to   his left palm while using a table saw.  He presented to the Emergency Department   transferred into [facility] with a laceration that extends from   the thenar eminence of the thumb deep across the palm to the level of the small   finger metacarpophalangeal joint.  There is a gaping wound with thenar   musculature with exposed muscle and  tendon.  There is no active flexion of the   thumb.  The thumb and index finger is a sensate, the long fingers a sensate   along the radial border, the ring and small fingers are a sensate.  He appears   to have an intact flexor tendon function of the index, long, ring and small   fingers.  After the potential risks as well as risks and complications of   treatment options were reviewed with the patient and all questions were   answered, he has elected to undergo the above procedure.    PROCEDURE IN DETAIL:  After proper informed consent was obtained, the patient   was transported to the Operating Suite, placed supine on the operating table.    General endotracheal anesthesia was administered per the anesthesiologist   without difficulty.  The left hand was then thoroughly irrigated, prepped with   alcohol, Betadine scrub and prepped and drape in the usual sterile fashion.  The   left hand was then identified as the operative site by the operative team   during a routine preoperative timeout.  Preoperative antibiotics were   administered.  The left hand was thoroughly lavaged with copious amounts of   saline and did not appear to have any gross contamination or nonviable tissue.    After 1 minute elevation and Esmarch exsanguination, a well-padded upper arm   tourniquet was then elevated to 250 mmHg.  The thenar wound was explored and the   incision was extended into the palm along the radial border of the ring finger.    Full exploration of the zone of injury revealed a small nick in the thumb   metacarpal cortex with no complete fracture.  The flexor pollicis longus tendon   was fully lacerated as well as the digital nerves and nerves as described above.    The flexor tendon sheaths of the index, long, ring and small finger all   appeared intact.  There was only some minor injury to the intrinsic musculature.    After further thorough lavaged, attention was turned to the thumb flexor   tendon, which was lacerated  about the level of the middle of the thumb   metacarpal.  It was retrieved without difficulty proximally and the distal stump   was retrieved with flexing the thumb tip.  This laceration was repaired using   4-0 FiberWire in a modified teres locking core stitch with 6 core sutures.    Epitendinous repair was performed using a 6-0 Prolene in a silverskold type   epitendinous repair providing a very stable repair without any bunching or   gapping on full motion of the thumb.  Note that the repair was proximal to the   pulley system and appeared to have smooth excursion without any catching or   bunching.  The thenar musculature was then repaired with a couple of interrupted   fascial sutures.  Attention was then turned to the nerve lacerations.  Radial   digital nerve of the thumb had minimal retraction and was able to be   approximated within about 0.5 cm repaired with AxoGen nerve conduit protector.    The microscope was brought in at this portion of the procedure for the nerve   repairs and under the operative microscope, the conduit was secured to the   proximal and distal stumps overlapping the stumps by about 0.5 cm proximally and   distally using an epineurial 9-0 suture.  This provided about 0.5 cm gap   without any undue tension on the nerves.  Note that prior to repair of the   nerves, the proximal and distal stumps ends were removed with a #15 blade   scalpel until normal fascicles were identified.  The other nerves they were   lacerated as noted above.  All had significant retraction and damaged to the   nerve in the palm more than 2 cm each, therefore allograft nerve conduit was   selected.  The distal stumps were identified without much difficulty and these   were mobilized as noted above.  The distal stumps were freshened with a #15   blade scalpel, removing about 2 to 3 mm of each of the stump such that normal   fascicles were identified.  The proximal stumps were not readily visible and   therefore,  the incision was carried into the palm where the palmar fascia was   identified and divided longitudinally under direct visualization.  The   transverse carpal ligament was identified and divided longitudinally under   direct visualization as well to approximately 2 cm proximal to the wrist crease,   fully decompressing the carpal tunnel.  The flexor tendons and median nerve was   identified at this level proximal to the zone of injury and then traced   distally such that the various stumps were able to be dissected and identified.    These stumps were then prepared using the #15 blade scalpel in a guillotine   resection of about 2 mm off of each stump until healthy normal appearing   fascicles were identified.  Each of the above cable grafts was performed under   the operative microscope without any tension and the repair was performed using   9-0 interrupted epineurial sutures with about four to six epineurial sutures at   each anastomosis.  Once each of these grafts were in place, the wrist and hand   was taken through a range of motion and the grafts appeared to have appropriate   repairs at each anastomosis site without any undue tension.  The wound was   further irrigated.  The tourniquet was deflated after the nerves were all   identified and was inflated for no more than 2 hours.  There was excellent   vascular inflow throughout all the digits at the end of the procedure.  Lap,   instrument and needle counts were correct.  After deflation of the tourniquet,   meticulous hemostasis was confirmed using the bipolar cautery and the palmar   wounds with then all closed with 4-0 nylon vertical mattress interrupted suture.    A sterile soft dressing was then applied and the hand was placed into a dorsal   blocking splint with the wrist in neutral position and the MP joints in about   45 degrees flexion allowing unrestricted passive flexion.  The patient was   awakened in the Operating Suite and taken to Recovery  area in stable condition.    He tolerated the procedure very well.  Lap, instrument and needle counts were   correct.    BLOOD LOSS:  Less than 100 mL.    COMPLICATIONS:  None.      YVONNE/SACHIN  dd: 09/13/2017 15:38:44 (CDT)  td: 09/13/2017 18:47:44 (CDT)  Doc ID   #[id number]  Job ID #[id number]    CC:

## 2017-09-14 NOTE — DISCHARGE SUMMARY
Discharge Note     SUMMARY      Admit Date: 9/3/2017     Discharge Date and Time:  09/04/2017 5:56 PM     Hospital Course (synopsis of major diagnoses, care, treatment, and services provided during the course of the hospital stay): uneventful post operative course     Final Diagnosis: Post-Op Diagnosis Codes:     * Laceration involving tendon [T14.8]   Left palmar laceration with lacertion of FDP and multiple palmar digital nerves  Disposition: Home or Self Care     Follow Up/Patient Instructions:    Keep dressing clean and dry, elevate hand  F/U in 5 days for reevaluation  Medications:  Reconciled Home Medications:       Current Discharge Medication List           START taking these medications     Details   cephALEXin (KEFLEX) 500 MG capsule Take 1 capsule (500 mg total) by mouth 4 (four) times daily.  Qty: 40 capsule, Refills: 0       oxycodone (ROXICODONE) 5 MG immediate release tablet Take 1 tablet (5 mg total) by mouth every 4 (four) hours as needed for Pain.  Qty: 30 tablet, Refills: 0               CONTINUE these medications which have NOT CHANGED     Details   famotidine (PEPCID) 20 MG tablet Take 20 mg by mouth once daily.       gemfibrozil (LOPID) 600 MG tablet Take 600 mg by mouth once daily.       methocarbamol (ROBAXIN) 750 MG Tab Take 750 mg by mouth nightly as needed. 2 tabs nightly       pantoprazole (PROTONIX) 40 MG tablet Take 40 mg by mouth once daily.                 Discharge Procedure Orders  Diet general      Call MD for:  temperature >100.4      Call MD for:  persistent nausea and vomiting      Call MD for:  severe uncontrolled pain      Call MD for:  difficulty breathing, headache or visual disturbances      Call MD for:  redness, tenderness, or signs of infection (pain, swelling, redness, odor or green/yellow discharge around incision site)      Call MD for:  hives      Call MD for:  persistent dizziness or light-headedness      Call MD for:  extreme fatigue      Leave dressing on -  Keep it clean, dry, and intact until clinic visit      Keep surgical extremity elevated      Lifting restrictions      No driving, operating heavy equipment or signing legal documents while taking pain medication           Follow-up Information      Claude S. Williams Iv, MD On 9/8/2017.    Specialty:  Orthopedic Surgery  Why:  For wound re-check  Contact information:  [address]  [facility] [address]  [phone]

## (undated) DEVICE — SUT BLU BR 4-0 W/DMD PT 3/8

## (undated) DEVICE — GLOVE BIOGEL SKINSENSE PI 7.0

## (undated) DEVICE — DRESSING XEROFORM FOIL PK 1X8

## (undated) DEVICE — APPLICATOR CHLORAPREP ORN 26ML

## (undated) DEVICE — SUT 4.0 ETHILON

## (undated) DEVICE — DRESSING N ADH OIL EMUL 3X3

## (undated) DEVICE — NDL HYPO REG 25G X 1 1/2

## (undated) DEVICE — GAUZE SPONGE 4'X4 12 PLY

## (undated) DEVICE — UNDERGLOVES BIOGEL PI SIZE 8

## (undated) DEVICE — SEE MEDLINE ITEM 152529

## (undated) DEVICE — GAUZE SPONGE 4X4 12PLY

## (undated) DEVICE — GLOVE BIOGEL SKINSENSE PI 6.5

## (undated) DEVICE — SUT ETHILON 4-0 PS4 18 BLK

## (undated) DEVICE — PADDING CAST 4IN

## (undated) DEVICE — GLOVE BIOGEL SKINSENSE PI 7.5

## (undated) DEVICE — CORD FOR BIPOLAR FORCEPS 12

## (undated) DEVICE — SUT 9/0 5IN ETHILON BLK MON

## (undated) DEVICE — UNDERGLOVE BIOGEL PI SZ 6.5 LF

## (undated) DEVICE — SPLINT PLASTER FAST SET 5X30IN

## (undated) DEVICE — PAD CAST SPECIALIST STRL 4

## (undated) DEVICE — SUT PROLENE 3-0 30SH

## (undated) DEVICE — SUT ETHILON 3-0 PS2 18 BLK

## (undated) DEVICE — HOOK STAY ELAS 5MM 8EA/PK

## (undated) DEVICE — DRESSING N ADH OIL EMUL 3X8 3S

## (undated) DEVICE — PACK UPPER EXTREMITY BAPTIST

## (undated) DEVICE — SUT 4/0 18IN ETHILON BL P3